# Patient Record
(demographics unavailable — no encounter records)

---

## 2024-11-19 NOTE — PHYSICAL EXAM
[No Acute Distress] : no acute distress [Well Nourished] : well nourished [EOMI] : extraocular movements intact [Supple] : supple [Clear to Auscultation] : lungs were clear to auscultation bilaterally [Normal S1, S2] : normal S1 and S2 [No Edema] : there was no peripheral edema [Non Tender] : non-tender [No CVA Tenderness] : no CVA  tenderness [No Rash] : no rash [Normal Gait] : normal gait [Normal Affect] : the affect was normal

## 2024-11-19 NOTE — HEALTH RISK ASSESSMENT
[Very Good] : ~his/her~  mood as very good [Intercurrent ED visits] : went to ED [Yes] : Yes [4 or more  times a week (4 pts)] : 4 or more  times a week (4 points) [1 or 2 (0 pts)] : 1 or 2 (0 points) [Never (0 pts)] : Never (0 points) [No] : In the past 12 months have you used drugs other than those required for medical reasons? No [No falls in past year] : Patient reported no falls in the past year [0] : 2) Feeling down, depressed, or hopeless: Not at all (0) [PHQ-2 Negative - No further assessment needed] : PHQ-2 Negative - No further assessment needed [Current] : Current [20 or more] : 20 or more [NO] : No [Patient reported mammogram was abnormal] : Patient reported mammogram was abnormal [Patient reported PAP Smear was normal] : Patient reported PAP Smear was normal [Patient declined colonoscopy] : Patient declined colonoscopy [HIV test declined] : HIV test declined [Hepatitis C test declined] : Hepatitis C test declined [None] : None [With Family] : lives with family [Employed] : employed [College] : College [] :  [# Of Children ___] : has [unfilled] children [Sexually Active] : sexually active [Feels Safe at Home] : Feels safe at home [Fully functional (bathing, dressing, toileting, transferring, walking, feeding)] : Fully functional (bathing, dressing, toileting, transferring, walking, feeding) [Fully functional (using the telephone, shopping, preparing meals, housekeeping, doing laundry, using] : Fully functional and needs no help or supervision to perform IADLs (using the telephone, shopping, preparing meals, housekeeping, doing laundry, using transportation, managing medications and managing finances) [Smoke Detector] : smoke detector [Carbon Monoxide Detector] : carbon monoxide detector [Safety elements used in home] : safety elements used in home [Seat Belt] :  uses seat belt [Sunscreen] : uses sunscreen [Name: ___] : Health Care Proxy's Name: [unfilled]  [Relationship: ___] : Relationship: [unfilled] [With Patient/Caregiver] : , with patient/caregiver [Reviewed no changes] : Reviewed, no changes [Audit-CScore] : 4 [[id number]] : 0 [Change in mental status noted] : No change in mental status noted [Language] : denies difficulty with language [Behavior] : denies difficulty with behavior [Learning/Retaining New Information] : denies difficulty learning/retaining new information [Handling Complex Tasks] : denies difficulty handling complex tasks [Reasoning] : denies difficulty with reasoning [Spatial Ability and Orientation] : denies difficulty with spatial ability and orientation [High Risk Behavior] : no high risk behavior [Reports changes in hearing] : Reports no changes in hearing [Reports changes in vision] : Reports no changes in vision [Reports changes in dental health] : Reports no changes in dental health [MammogramDate] : 2023 [MammogramComments] : cysts, mammogram script w/ US order  [PapSmearDate] : 2023 [PapSmearComments] : F.u gyn, referral today [ColonoscopyComments] : referral colonoscopy today [de-identified] : dentist twice a year [AdvancecareDate] : 11/19/2024

## 2024-11-19 NOTE — ASSESSMENT
[FreeTextEntry1] : completed physical exam, blood work and urinalysis ordered today, will follow up accordingly. HCM: due for mammogram, pap, colonoscopy, referral for all provided vaccines: Shingrix sent to pharmacy, flu vaccine declined. COVID-19 vaccine up to date. Advised to get annual booster vaccine. Tdap deferred vitals, ECG stable arrythmia palpitation, follow up with cardiology. possible Holter monitoring.  No acute new health concerns today.

## 2024-11-19 NOTE — HISTORY OF PRESENT ILLNESS
[FreeTextEntry1] : comprehensive visit [de-identified] : Ms. Bree Gordillo is a 51 yo female presents today for annual exam and labs. Pt reports overall doing well, even migraine and mental health improved. Pt reports good control with Nurtec OTD for migraine. Pt does occasional concerns for arrythmia, states displaying on machine upon taking her bp. Recommended to get Holter monitoring, advised to follow up with cardiologist as well. Other wise, denies any fever, chills, n/v/c/d.

## 2024-12-04 NOTE — CARDIOLOGY SUMMARY
[de-identified] : 1/19/23 normal 12/4/24 Normal sinus rhythm nonspecific T wave appearance [de-identified] : 1/19/23 normal

## 2024-12-04 NOTE — REASON FOR VISIT
[FreeTextEntry1] : Bree was diagnosed with COVID in  November 2022. Since that time she has felt down and even felt winded. Recently she was diagnosed with hypertension 176/53 and placed on losartan 25 which was increased to 50 mg per day. She has noted some back pain and numbness in her legs. She took NyQuil and DayQuil for relief. She smoked cigarettes since age 18 but denies history of asthma. She works in [employer] and . There is a family history of cardiovascular disease on her father's side with massive Mi at 50 and a grandmother with stroke. On her mother's side, there is hypertension and diabetes however, they lived into their 90s. She is able to lie flat at night with one pillow. she took sumatriptan for a migraine history. she comes today for clarification on of her overall cardiovascular risk. she  was advised to undergo a complete cardiac evaluation. she denies chest pains shortness of breath or loss of consciousnes.  12/4/2024  Bree comes today mostly precautionary. She has felt some palpitations and her Apple Watch says "arrhythmia." T wave changes were noted on the cardiogram and her primary care suggested follow-up total.  last year total cholesterol 220 HDL 54 .blood pressure responding nicely to a regimen of losartan labetalol and chlorthalidone. [place] age 69 risk 8.8% . father had massive MI but lived to age 71. all other paternal relatives did not make it to 60. "i'm like my father." review of a nondedicated CT demonstrates some calcium in the aortic tree

## 2024-12-04 NOTE — DISCUSSION/SUMMARY
[FreeTextEntry1] : I have asked  Bree to undergo detailed cardiac testing in order to evaluate her overall cardiovascular risk. An assessment of both structural and functional heart disease was recommended to the patient. In this regard, an echocardiogram and a stress test were advised to the patient. I await the upcoming noninvasive cardiac testing in order to assess her overall cardiovascular risk. We discussed the pros and cons of plain treadmill stress testing nuclear stress testing and angiography including a sensitivity analysis.We discussed current ACC guidelines and the calculated 10 year risk is approximately to be determined .  She will keep a log of her blood pressure . complete blood work is pending. More than half of the face to face encounter of 60 minutes   was spent in counseling the patient with respect to  cardiovascular risk  Quality measures  Tobacco intervention indicated Statin for prevention of cardiovascular disease to be determined Hypertension begin amlodipine consider labetalol if deemed a candidate based on pulmonary status Aspirin for ischemic vascular disease not indicated Tobacco screening cessation and intervention indicated  Medical necessity This is a high encounter based upon two or more chronic illnesses with mild exacerbation requiring further management and evaluation.   .  12/4/24  EKG today finding today are nonspecific. smoking cessation advice.  repeat labs are pending. would consider for statin. a cardiac CAT scan may be helpful as well. CTA may be considered if symptoms supervene. a 2-week monitor is suggested to rule out A-fib concerning for stroke.  Prior noninvasive testing satisfactory. await repeat labs  Medical necessity This is a high encounter based upon two or more chronic illnesses with mild exacerbation requiring further management and evaluation.   .  EKG is indicated for evaluation of hypertension  risks benefits alternatives were discussed with the patient. all questions were answered to their satisfaction.  [EKG obtained to assist in diagnosis and management of assessed problem(s)] : EKG obtained to assist in diagnosis and management of assessed problem(s)

## 2025-01-16 NOTE — CARDIOLOGY SUMMARY
[de-identified] : 1/19/23 normal 12/4/24 Normal sinus rhythm nonspecific T wave appearance [de-identified] : 1/19/23 normal

## 2025-01-16 NOTE — DISCUSSION/SUMMARY
[EKG obtained to assist in diagnosis and management of assessed problem(s)] : EKG obtained to assist in diagnosis and management of assessed problem(s) [FreeTextEntry1] : I have asked  Bree to undergo detailed cardiac testing in order to evaluate her overall cardiovascular risk. An assessment of both structural and functional heart disease was recommended to the patient. In this regard, an echocardiogram and a stress test were advised to the patient. I await the upcoming noninvasive cardiac testing in order to assess her overall cardiovascular risk. We discussed the pros and cons of plain treadmill stress testing nuclear stress testing and angiography including a sensitivity analysis.We discussed current ACC guidelines and the calculated 10 year risk is approximately to be determined .  She will keep a log of her blood pressure . complete blood work is pending. More than half of the face to face encounter of 60 minutes   was spent in counseling the patient with respect to  cardiovascular risk  Quality measures  Tobacco intervention indicated Statin for prevention of cardiovascular disease to be determined Hypertension begin amlodipine consider labetalol if deemed a candidate based on pulmonary status Aspirin for ischemic vascular disease not indicated Tobacco screening cessation and intervention indicated  Medical necessity This is a high encounter based upon two or more chronic illnesses with mild exacerbation requiring further management and evaluation.   .  12/4/24  EKG today finding today are nonspecific. smoking cessation advice.  repeat labs are pending. would consider for statin. a cardiac CAT scan may be helpful as well. CTA may be considered if symptoms supervene. a 2-week monitor is suggested to rule out A-fib concerning for stroke.  Prior noninvasive testing satisfactory. await repeat labs  1/16/25 ZIO monitor is reviewed and demonstrates sinus tachycardia which does not require specific intervention and may reflect a secondary tachycardia. would resume antihypertensives if blood pressure greater than 150 systolic. would consider for a statin if repeat LDL greater than 160 3-month visit strict diet report signs and symptoms  Medical necessity This is a high encounter based upon two or more chronic illnesses with mild exacerbation requiring further management and evaluation.   .  EKG is indicated for evaluation of hypertension  risks benefits alternatives were discussed with the patient. all questions were answered to their satisfaction.

## 2025-01-16 NOTE — REASON FOR VISIT
[FreeTextEntry1] : Bree was diagnosed with COVID in  November 2022. Since that time she has felt down and even felt winded. Recently she was diagnosed with hypertension 176/53 and placed on losartan 25 which was increased to 50 mg per day. She has noted some back pain and numbness in her legs. She took NyQuil and DayQuil for relief. She smoked cigarettes since age 18 but denies history of asthma. She works in [employer] and . There is a family history of cardiovascular disease on her father's side with massive Mi at 50 and a grandmother with stroke. On her mother's side, there is hypertension and diabetes however, they lived into their 90s. She is able to lie flat at night with one pillow. she took sumatriptan for a migraine history. she comes today for clarification on of her overall cardiovascular risk. she  was advised to undergo a complete cardiac evaluation. she denies chest pains shortness of breath or loss of consciousnes.  12/4/2024  Bree comes today mostly precautionary. She has felt some palpitations and her Apple Watch says "arrhythmia." T wave changes were noted on the cardiogram and her primary care suggested follow-up total.  last year total cholesterol 220 HDL 54 .blood pressure responding nicely to a regimen of losartan labetalol and chlorthalidone. [place] age 69 risk 8.8% . father had massive MI but lived to age 71. all other paternal relatives did not make it to 60. "i'm like my father." review of a nondedicated CT demonstrates some calcium in the aortic tree  1/16/2025 Bree returns from [place]. she was feeling ill with a viral illness and cold now recovering. blood pressure low off medications are satisfactory. we note marked elevation in LDL cholesterol . . some calcification noted of the aortic tree most likely genetic in nature especially given family history on her paternal side positive family history

## 2025-01-28 NOTE — PROCEDURE
[IUD Removal] : intrauterine device (IUD) removal [Time out performed] : Pre-procedure time out performed.  Patient's name, date of birth and procedure confirmed. [Consent Obtained] : Consent obtained [Risks] : risks [Benefits] : benefits [Alternatives] : alternatives [Patient] : patient [Speculum Placed] : speculum placed [Strings Visualized] : strings visualized [IUD Discarded] : IUD discarded [Sent to Pathology] : specimen was placed in buffered formalin and sent for pathology [Tolerated Well] : Patient tolerated the procedure well [No Complications] : no complications [Heavy Vaginal Bleeding] : for heavy vaginal bleeding [Pelvic Pain] : for pelvic pain

## 2025-01-28 NOTE — HISTORY OF PRESENT ILLNESS
[FreeTextEntry1] : 50  presents today for well woman exam.  LMP: Mirena, removed today-- labs reviewed from dec, menopausal  POB: svdx2 PGYN: prev reg, heavy, nl pap PMH: HTN PSH:squamous cell carcinoma removal Med: per MAR All: amlodipine SH:smoker FH: denies  Mammo: 2 years ago

## 2025-01-28 NOTE — PHYSICAL EXAM
[Chaperone Present] : A chaperone was present in the examining room during all aspects of the physical examination [00958] : A chaperone was present during the pelvic exam. [FreeTextEntry2] : Cristina [Appropriately responsive] : appropriately responsive [Alert] : alert [No Acute Distress] : no acute distress [No Lymphadenopathy] : no lymphadenopathy [Soft] : soft [Non-tender] : non-tender [Non-distended] : non-distended [No HSM] : No HSM [No Lesions] : no lesions [No Mass] : no mass [Oriented x3] : oriented x3 [Examination Of The Breasts] : a normal appearance [No Masses] : no breast masses were palpable [Labia Majora] : normal [Labia Minora] : normal [Normal] : normal [Uterine Adnexae] : normal

## 2025-02-13 NOTE — PHYSICAL EXAM
[Chaperone Present] : A chaperone was present in the examining room during all aspects of the physical examination [42097] : A chaperone was present during the pelvic exam. [Appropriately responsive] : appropriately responsive [Oriented x3] : oriented x3

## 2025-02-13 NOTE — PROCEDURE
[IUD Placement] : intrauterine device (IUD) placement [Time out performed] : Pre-procedure time out performed.  Patient's name, date of birth and procedure confirmed. [Consent Obtained] : Consent obtained [Risks] : risks [Benefits] : benefits [Alternatives] : alternatives [Patient] : patient [Infection] : infection [Bleeding] : bleeding [Pain] : pain [Expulsion] : expulsion [Failure] : failure [Uterine Perforation] : uterine perforation [Neg Pregnancy Test] : negative pregnancy test [Betadine] : Betadine [Tenaculum] : Tenaculum [Mirena IUD] : Mirena IUD [Tolerated Well] : Patient tolerated the procedure well [No Complications] : No complications